# Patient Record
Sex: MALE | Race: BLACK OR AFRICAN AMERICAN | Employment: UNEMPLOYED | URBAN - METROPOLITAN AREA
[De-identification: names, ages, dates, MRNs, and addresses within clinical notes are randomized per-mention and may not be internally consistent; named-entity substitution may affect disease eponyms.]

---

## 2017-10-15 ENCOUNTER — HOSPITAL ENCOUNTER (EMERGENCY)
Age: 47
Discharge: HOME | End: 2017-10-15
Attending: EMERGENCY MEDICINE
Payer: SELF-PAY

## 2017-10-15 VITALS
SYSTOLIC BLOOD PRESSURE: 134 MMHG | RESPIRATION RATE: 16 BRPM | BODY MASS INDEX: 28.66 KG/M2 | TEMPERATURE: 97.6 F | DIASTOLIC BLOOD PRESSURE: 90 MMHG | HEART RATE: 94 BPM | OXYGEN SATURATION: 98 % | HEIGHT: 65 IN | WEIGHT: 172 LBS
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
## 2017-10-15 DIAGNOSIS — S80.11XA CONTUSION OF LEG, RIGHT, INITIAL ENCOUNTER: ICD-10-CM

## 2017-10-15 DIAGNOSIS — S39.012A STRAIN OF LUMBAR REGION, INITIAL ENCOUNTER: ICD-10-CM

## 2017-10-15 PROCEDURE — 99282 EMERGENCY DEPT VISIT SF MDM: CPT

## 2017-10-15 RX ORDER — MELOXICAM 15 MG/1
15 TABLET ORAL DAILY
Qty: 20 TAB | Refills: 0 | Status: SHIPPED | OUTPATIENT
Start: 2017-10-15 | End: 2018-01-25

## 2017-10-15 NOTE — ED NOTES
.See Nursing Assessment      Emergency Department Nursing Plan of Care       The Nursing Plan of Care is developed from the Nursing assessment and Emergency Department Attending provider initial evaluation. The plan of care may be reviewed in the ED Provider note.     The Plan of Care was developed with the following considerations:   Patient / Family readiness to learn indicated by:verbalized understanding  Persons(s) to be included in education: patient  Barriers to Learning/Limitations:No    Signed     Bry Clay RN    10/15/2017   4:45 PM

## 2017-10-15 NOTE — ED NOTES
Patient (s)  given copy of dc instructions and 0 paper script(s) and 1 electronic scripts. Patient (s)  verbalized understanding of instructions and script (s). Patient given a current medication reconciliation form and verbalized understanding of their medications. Patient (s) verbalized understanding of the importance of discussing medications with  his or her physician or clinic they will be following up with. Patient alert and oriented and in no acute distress. Patient offered wheelchair from treatment area to hospital entrance, patient declined wheelchair.      Patient was discharged by the provider

## 2017-10-15 NOTE — ED PROVIDER NOTES
Patient is a 56 y.o. male presenting with fall. Fall   Pertinent negatives include no fever, no abdominal pain, no nausea, no vomiting, no hematuria and no headaches. To ED with complaints of moderate, constant low back pain and R thigh pain. Worse with stepping up with R leg or bending. Started 2 days ago after slipping when getting out of shower, falling and landing on right side, right thigh, right hip. Has had some \"soreness\" pain since then. May have hit head, pt unsure, but no loc. No ha, no neck pain. No prior h/o back pain or joint problems. Has not tried any medication for sx. In methadone clinic, last dose today. No chest or abd pain.    Past Medical History:   Diagnosis Date    Hypertension     Sebaceous cyst 5/5/2016    Vitamin D deficiency        Past Surgical History:   Procedure Laterality Date    HX CYST REMOVAL      cyst removal from thyroid August 2016    HX HEENT      wisdom teeth         Family History:   Problem Relation Age of Onset    Hypertension Mother     Diabetes Maternal Grandmother     Hypertension Maternal Grandmother     Dementia Maternal Grandmother     Cancer Maternal Grandmother      breast     No Known Problems Brother     No Known Problems Son     No Known Problems Son     No Known Problems Son     No Known Problems Son     No Known Problems Daughter     Asthma Daughter        Social History     Social History    Marital status: LEGALLY      Spouse name: N/A    Number of children: N/A    Years of education: N/A     Occupational History          works in a [employer]     Social History Main Topics    Smoking status: Former Smoker     Packs/day: 0.50     Quit date: 3/31/2010    Smokeless tobacco: Never Used    Alcohol use 16.8 oz/week     28 Cans of beer per week    Drug use: Yes     Special: Cocaine, Heroin      Comment: last use 4 days ago     Sexual activity: Yes     Partners: Female      Comment: key-on-- ok tp give information Other Topics Concern    Not on file     Social History Narrative         ALLERGIES: Review of patient's allergies indicates no known allergies. Review of Systems   Constitutional: Negative for chills and fever. HENT: Negative for congestion, rhinorrhea and sore throat. Respiratory: Negative for cough and shortness of breath. Cardiovascular: Negative for chest pain. Gastrointestinal: Negative for abdominal pain, nausea and vomiting. Genitourinary: Negative for hematuria. Musculoskeletal: Positive for back pain. Negative for neck pain. As per HPI. No other joint sx. Skin: Negative for rash and wound. Neurological: Negative for seizures, syncope and headaches. Psychiatric/Behavioral: Negative for confusion. The patient is not nervous/anxious. All other systems reviewed and are negative. Vitals:    10/15/17 1629   BP: 134/90   Pulse: 94   Resp: 16   Temp: 97.6 °F (36.4 °C)   SpO2: 98%   Weight: 78 kg (172 lb)   Height: 5' 5\" (1.651 m)            Physical Exam   Constitutional: He is oriented to person, place, and time. He appears well-developed and well-nourished. No distress. HENT:   Head: Normocephalic. Eyes: Pupils are equal, round, and reactive to light. Right eye exhibits no discharge. Left eye exhibits no discharge. Neck: Normal range of motion. Cardiovascular: Normal rate. No murmur heard. Pulmonary/Chest: Effort normal. He has no wheezes. Abdominal: Soft. There is no tenderness. There is no rebound. Musculoskeletal:   No bony C, T, L spine ttp. Mild Tenderness to right lower paravertebral muscles and mid right buttock. Tenderness over lateral thigh muscles. No ecchymosis. No swelling. No GT tendereness. R hip FROM with slight pain to mid lateral thigh - no pain to groin or hip with hip ROM. R knee FROM with slight discomfort. Stable with stress. No effusion. No erythema. No swelling. R ankle DF/PF 5/5 B. Sensation intact.    SL negative Lymphadenopathy:     He has no cervical adenopathy. Neurological: He is alert and oriented to person, place, and time. Skin: Skin is warm and dry. He is not diaphoretic. Psychiatric: He has a normal mood and affect. His behavior is normal.   Nursing note and vitals reviewed. MDM  Number of Diagnoses or Management Options  Contusion of leg, right, initial encounter:   Fall, initial encounter:   Strain of lumbar region, initial encounter:   Diagnosis management comments: DDX: contusion, sprain, strain,   Doubt fx as he had no bony tenderness on exam and has been ambulatory for two days since fall. Leg pain could certainly be sciatica, however on exam he is more focally tender and he notes that is where he landed when fell. Discussed diagnosis with patient. Will treat with NSAIDS. Follow up PCP if not improving. ED Course       Procedures        LABORATORY TESTS:  No results found for this or any previous visit (from the past 12 hour(s)). IMAGING RESULTS:  No orders to display       MEDICATIONS GIVEN:  Medications - No data to display    IMPRESSION:  1. Fall, initial encounter    2. Contusion of leg, right, initial encounter    3. Strain of lumbar region, initial encounter        PLAN:  1. Discharge Medication List as of 10/15/2017  5:07 PM      START taking these medications    Details   meloxicam (MOBIC) 15 mg tablet Take 1 Tab by mouth daily. , Normal, Disp-20 Tab, R-0         CONTINUE these medications which have NOT CHANGED    Details   METHADONE HCL (METHADONE PO) Take  by mouth., Historical Med      metoprolol (LOPRESSOR) 25 mg tablet Take 1 Tab by mouth two (2) times a day., Normal, Disp-60 Tab, R-0      lisinopril (PRINIVIL, ZESTRIL) 20 mg tablet Take 1 Tab by mouth daily. , Normal, Disp-30 Tab, R-3           2.    Follow-up Information     None        Return to ED if worse

## 2017-10-15 NOTE — DISCHARGE INSTRUCTIONS
Back Strain: Care Instructions  Your Care Instructions    Back strain happens when you overstretch, or pull, a muscle in your back. You may hurt your back in an accident or when you exercise or lift something. Most back pain will get better with rest and time. You can take care of yourself at home to help your back heal.  Follow-up care is a key part of your treatment and safety. Be sure to make and go to all appointments, and call your doctor if you are having problems. It's also a good idea to know your test results and keep a list of the medicines you take. How can you care for yourself at home? · Try to stay as active as you can, but stop or reduce any activity that causes pain. · Put ice or a cold pack on the sore muscle for 10 to 20 minutes at a time to stop swelling. Try this every 1 to 2 hours for 3 days (when you are awake) or until the swelling goes down. Put a thin cloth between the ice pack and your skin. · After 2 or 3 days, apply a heating pad on low or a warm cloth to your back. Some doctors suggest that you go back and forth between hot and cold treatments. · Take pain medicines exactly as directed. ¨ If the doctor gave you a prescription medicine for pain, take it as prescribed. ¨ If you are not taking a prescription pain medicine, ask your doctor if you can take an over-the-counter medicine. · Try sleeping on your side with a pillow between your legs. Or put a pillow under your knees when you lie on your back. These measures can ease pain in your lower back. · Return to your usual level of activity slowly. When should you call for help? Call 911 anytime you think you may need emergency care. For example, call if:  · You are unable to move a leg at all. Call your doctor now or seek immediate medical care if:  · You have new or worse symptoms in your legs, belly, or buttocks. Symptoms may include:  ¨ Numbness or tingling. ¨ Weakness. ¨ Pain.   · You lose bladder or bowel control. Watch closely for changes in your health, and be sure to contact your doctor if you are not getting better as expected. Where can you learn more? Go to http://dena-darcie.info/. Enter V712 in the search box to learn more about \"Back Strain: Care Instructions. \"  Current as of: March 21, 2017  Content Version: 11.3  © 7385-3658 [employer]. Care instructions adapted under license by [employer] (which disclaims liability or warranty for this information). If you have questions about a medical condition or this instruction, always ask your healthcare professional. [place] [address] any warranty or liability for your use of this information. Contusion: Care Instructions  Your Care Instructions  Contusion is the medical term for a bruise. It is the result of a direct blow or an impact, such as a fall. Contusions are common sports injuries. Most people think of a bruise as a black-and-blue spot. This happens when small blood vessels get torn and leak blood under the skin. But bones, muscles, and organs can also get bruised. This may damage deep tissues but not cause a bruise you can see. The doctor will do a physical exam to find the location of your contusion. You may also have tests to make sure you do not have a more serious injury, such as a broken bone or nerve damage. These may include X-rays or other imaging tests like a CT scan or MRI. Deep-tissue contusions may cause pain and swelling. But if there is no serious damage, they will often get better in a few weeks with home treatment. The doctor has checked you carefully, but problems can develop later. If you notice any problems or new symptoms, get medical treatment right away. Follow-up care is a key part of your treatment and safety. Be sure to make and go to all appointments, and call your doctor if you are having problems.  It's also a good idea to know your test results and keep a list of the medicines you take. How can you care for yourself at home? · Put ice or a cold pack on the sore area for 10 to 20 minutes at a time to stop swelling. Put a thin cloth between the ice pack and your skin. · Be safe with medicines. Read and follow all instructions on the label. ¨ If the doctor gave you a prescription medicine for pain, take it as prescribed. ¨ If you are not taking a prescription pain medicine, ask your doctor if you can take an over-the-counter medicine. · If you can, prop up the sore area on pillows as much as possible for the next few days. Try to keep the sore area above the level of your heart. When should you call for help? Call your doctor now or seek immediate medical care if:  · Your pain gets worse. · You have new or worse swelling. · You have tingling, weakness, or numbness in the area near the contusion. · The area near the contusion is cold or pale. Watch closely for changes in your health, and be sure to contact your doctor if:  · You do not get better as expected. Where can you learn more? Go to Piki.be  Enter [id number] in the search box to learn more about \"Contusion: Care Instructions. \"   © 7750-7837 [employer]. Care instructions adapted under license by [facility] (which disclaims liability or warranty for this information). This care instruction is for use with your licensed healthcare professional. If you have questions about a medical condition or this instruction, always ask your healthcare professional. [place] [address] any warranty or liability for your use of this information.   Content Version: 56.0.912945; Current as of: May 22, 2015

## 2017-10-15 NOTE — LETTER
[facility] EMERGENCY DEPT 
[address] [place] [address] [address] [phone] Work/School Note Date: 10/15/2017 To Whom It May concern: 
 
Fouzia Calloway was seen and treated today in the emergency room by the following provider(s): 
Attending Provider: Jaz Feliciano MD 
Physician Assistant: RADHA Carter. Please excuse from work 10/16 and 10/17/2017. Fouzia Calloway may return to work on 10/18/2017. Sincerely, RADHA Carter

## 2017-12-13 ENCOUNTER — HOSPITAL ENCOUNTER (EMERGENCY)
Age: 47
Discharge: HOME | End: 2017-12-13
Attending: EMERGENCY MEDICINE
Payer: COMMERCIAL

## 2017-12-13 VITALS
RESPIRATION RATE: 16 BRPM | DIASTOLIC BLOOD PRESSURE: 87 MMHG | WEIGHT: 167 LBS | TEMPERATURE: 98.1 F | HEIGHT: 64 IN | SYSTOLIC BLOOD PRESSURE: 128 MMHG | BODY MASS INDEX: 28.51 KG/M2 | HEART RATE: 90 BPM | OXYGEN SATURATION: 100 %

## 2017-12-13 DIAGNOSIS — E86.0 DEHYDRATION: ICD-10-CM
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
## 2017-12-13 DIAGNOSIS — E87.6 HYPOKALEMIA: ICD-10-CM

## 2017-12-13 LAB
ANION GAP SERPL CALC-SCNC: 11 MMOL/L (ref 5–15)
ATRIAL RATE: 102 BPM
BASOPHILS # BLD: 0 K/UL (ref 0–0.1)
BASOPHILS NFR BLD: 0 % (ref 0–1)
BUN SERPL-MCNC: 11 MG/DL (ref 6–20)
BUN/CREAT SERPL: 10 (ref 12–20)
CALCIUM SERPL-MCNC: 8.4 MG/DL (ref 8.5–10.1)
CALCULATED P AXIS, ECG09: 59 DEGREES
CALCULATED R AXIS, ECG10: 48 DEGREES
CALCULATED T AXIS, ECG11: 40 DEGREES
CHLORIDE SERPL-SCNC: 108 MMOL/L (ref 97–108)
CO2 SERPL-SCNC: 24 MMOL/L (ref 21–32)
CREAT SERPL-MCNC: 1.1 MG/DL (ref 0.7–1.3)
DIAGNOSIS, 93000: NORMAL
EOSINOPHIL # BLD: 0 K/UL (ref 0–0.4)
EOSINOPHIL NFR BLD: 0 % (ref 0–7)
ERYTHROCYTE [DISTWIDTH] IN BLOOD BY AUTOMATED COUNT: 13.4 % (ref 11.5–14.5)
GLUCOSE BLD STRIP.AUTO-MCNC: 106 MG/DL (ref 65–100)
GLUCOSE SERPL-MCNC: 128 MG/DL (ref 65–100)
HCT VFR BLD AUTO: 42.4 % (ref 36.6–50.3)
HGB BLD-MCNC: 13.6 G/DL (ref 12.1–17)
LYMPHOCYTES # BLD: 1 K/UL (ref 0.8–3.5)
LYMPHOCYTES NFR BLD: 24 % (ref 12–49)
MCH RBC QN AUTO: 32.5 PG (ref 26–34)
MCHC RBC AUTO-ENTMCNC: 32.1 G/DL (ref 30–36.5)
MCV RBC AUTO: 101.2 FL (ref 80–99)
MONOCYTES # BLD: 0.3 K/UL (ref 0–1)
MONOCYTES NFR BLD: 7 % (ref 5–13)
NEUTS SEG # BLD: 2.9 K/UL (ref 1.8–8)
NEUTS SEG NFR BLD: 69 % (ref 32–75)
P-R INTERVAL, ECG05: 184 MS
PLATELET # BLD AUTO: 194 K/UL (ref 150–400)
POTASSIUM SERPL-SCNC: 3.3 MMOL/L (ref 3.5–5.1)
Q-T INTERVAL, ECG07: 336 MS
QRS DURATION, ECG06: 78 MS
QTC CALCULATION (BEZET), ECG08: 437 MS
RBC # BLD AUTO: 4.19 M/UL (ref 4.1–5.7)
SERVICE CMNT-IMP: ABNORMAL
SODIUM SERPL-SCNC: 143 MMOL/L (ref 136–145)
VENTRICULAR RATE, ECG03: 102 BPM
WBC # BLD AUTO: 4.3 K/UL (ref 4.1–11.1)

## 2017-12-13 PROCEDURE — 82962 GLUCOSE BLOOD TEST: CPT

## 2017-12-13 PROCEDURE — 36415 COLL VENOUS BLD VENIPUNCTURE: CPT | Performed by: EMERGENCY MEDICINE

## 2017-12-13 PROCEDURE — 80048 BASIC METABOLIC PNL TOTAL CA: CPT | Performed by: EMERGENCY MEDICINE

## 2017-12-13 PROCEDURE — 74011250636 HC RX REV CODE- 250/636: Performed by: EMERGENCY MEDICINE

## 2017-12-13 PROCEDURE — 96360 HYDRATION IV INFUSION INIT: CPT

## 2017-12-13 PROCEDURE — 85025 COMPLETE CBC W/AUTO DIFF WBC: CPT | Performed by: EMERGENCY MEDICINE

## 2017-12-13 PROCEDURE — 74011250637 HC RX REV CODE- 250/637: Performed by: EMERGENCY MEDICINE

## 2017-12-13 PROCEDURE — 99285 EMERGENCY DEPT VISIT HI MDM: CPT

## 2017-12-13 PROCEDURE — 93005 ELECTROCARDIOGRAM TRACING: CPT

## 2017-12-13 RX ORDER — POTASSIUM CHLORIDE 20MEQ/15ML
40 LIQUID (ML) ORAL
Status: DISCONTINUED | OUTPATIENT
Start: 2017-12-13 | End: 2017-12-13 | Stop reason: RX

## 2017-12-13 RX ORDER — POTASSIUM CHLORIDE 1.5 G/1.77G
40 POWDER, FOR SOLUTION ORAL
Status: COMPLETED | OUTPATIENT
Start: 2017-12-13 | End: 2017-12-13

## 2017-12-13 RX ADMIN — POTASSIUM CHLORIDE 40 MEQ: 1.5 POWDER, FOR SOLUTION ORAL at 09:35

## 2017-12-13 RX ADMIN — SODIUM CHLORIDE 1000 ML: 900 INJECTION, SOLUTION INTRAVENOUS at 09:16

## 2017-12-13 NOTE — ED NOTES
Pt given juice/bagged lunch. Pt denies any complaints. Iv fluids finishing. No si/s of acute distress. Call bell within reach.

## 2017-12-13 NOTE — ED NOTES

## 2017-12-13 NOTE — DISCHARGE INSTRUCTIONS

## 2017-12-13 NOTE — ED NOTES
....Discharge summary and discharge medications reviewed with patient and appropriate educational materials and side effects teaching were provided. No prescriptions given. Patient (s) verbalized understanding of the importance of discussing medications with his or her physician or clinic they will be following up with. No si/s of acute distress prior to discharge. Patient offered wheelchair from treatment area to hospital entrance, patient refused wheelchair. Denied pain or complaints. Pt tolerating food/water with no issues. Pt discharged with a steady gait with no si/s of acute distress.  Pt given work excuse note per request.

## 2017-12-13 NOTE — ED PROVIDER NOTES
EMERGENCY DEPARTMENT HISTORY AND PHYSICAL EXAM      Date: 12/13/2017  Patient Name: Edd Oppenheim    History of Presenting Illness     Chief Complaint   Patient presents with   [facility] Fall       History Provided By: Patient    HPI: Edd Oppenheim, 56 y.o. male with PMHx significant for HTN, presents via EMS to the ED after GLF PTA today. 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 He states he has had similar episodes in the past while working in cold conditions. He denies recent illness. Pt denies allergies to any medications. He states he discontinued taking antihypertensives ~6 months ago per his PCP. Pt specifically denies rhinorrhea, fever, chills, or decreased appetite. He notes alcohol and tobacco use. PCP: Merritt Das NP    There are no other complaints, changes, or physical findings at this time. Current Outpatient Prescriptions   Medication Sig Dispense Refill    METHADONE HCL (METHADONE PO) Take  by mouth.  meloxicam (MOBIC) 15 mg tablet Take 1 Tab by mouth daily. 20 Tab 0    metoprolol (LOPRESSOR) 25 mg tablet Take 1 Tab by mouth two (2) times a day. 60 Tab 0    lisinopril (PRINIVIL, ZESTRIL) 20 mg tablet Take 1 Tab by mouth daily.  27 Tab 3       Past History     Past Medical History:  Past Medical History:   Diagnosis Date    Hypertension     Sebaceous cyst 5/5/2016    Vitamin D deficiency        Past Surgical History:  Past Surgical History:   Procedure Laterality Date    HX CYST REMOVAL      cyst removal from thyroid August 2016    HX HEENT      wisdom teeth       Family History:  Family History   Problem Relation Age of Onset    Hypertension Mother     Diabetes Maternal Grandmother     Hypertension Maternal Grandmother     Dementia Maternal Grandmother     Cancer Maternal Grandmother      breast     No Known Problems Brother     No Known Problems Son     No Known Problems Son     No Known Problems Son     No Known Problems Son     No Known Problems Daughter     Asthma Daughter        Social History:  Social History   Substance Use Topics    Smoking status: Current Every Day Smoker     Packs/day: 0.25     Last attempt to quit: 3/31/2010    Smokeless tobacco: Never Used      Comment: 2-3cig/day    Alcohol use 16.8 oz/week     28 Cans of beer per week      Comment: 40oz \"of natty ice\" daily, last drink around 8 hrs PTA       Allergies:  No Known Allergies      Review of Systems   Review of Systems   Constitutional: Negative for activity change, appetite change (decreased), chills, fever and unexpected weight change. +GLF   HENT: Negative for congestion. Eyes: Negative for pain and visual disturbance. Respiratory: Negative for cough and shortness of breath. Cardiovascular: Negative for chest pain. Gastrointestinal: Negative for abdominal pain, diarrhea, nausea and vomiting. Genitourinary: Negative for dysuria. Musculoskeletal: Negative for back pain. Skin: Negative for rash. Neurological: Negative for syncope and headaches. Physical Exam   Physical Exam   Constitutional: He is oriented to person, place, and time. He appears well-developed and well-nourished. HENT:   Head: Normocephalic and atraumatic. Mouth/Throat: Oropharynx is clear and moist.   Eyes: Conjunctivae and EOM are normal. Pupils are equal, round, and reactive to light. Right eye exhibits no discharge. Left eye exhibits no discharge. Neck: Normal range of motion. Neck supple. Cardiovascular: Normal rate, regular rhythm and normal heart sounds. No murmur heard. Pulmonary/Chest: Effort normal and breath sounds normal. No respiratory distress. He has no wheezes. He has no rales. Abdominal: Soft. Bowel sounds are normal. He exhibits no distension. There is no tenderness. Musculoskeletal: Normal range of motion.  He exhibits no edema. No evidence of trauma to back, no vertebral tenderness or step-off   Neurological: He is alert and oriented to person, place, and time. No cranial nerve deficit. He exhibits normal muscle tone. Skin: Skin is warm and dry. No rash noted. He is not diaphoretic. Nursing note and vitals reviewed. Diagnostic Study Results     Labs -     Recent Results (from the past 12 hour(s))   GLUCOSE, POC    Collection Time: 12/13/17  8:22 AM   Result Value Ref Range    Glucose (POC) 106 (H) 65 - 100 mg/dL    Performed by Jailyn Dorado    EKG, 12 LEAD, INITIAL    Collection Time: 12/13/17  8:28 AM   Result Value Ref Range    Ventricular Rate 102 BPM    Atrial Rate 102 BPM    P-R Interval 184 ms    QRS Duration 78 ms    Q-T Interval 336 ms    QTC Calculation (Bezet) 437 ms    Calculated P Axis 59 degrees    Calculated R Axis 48 degrees    Calculated T Axis 40 degrees    Diagnosis       Sinus tachycardia  Possible Left atrial enlargement  Septal infarct , age undetermined  Abnormal ECG  No previous ECGs available         Radiologic Studies -   No orders to display     CT Results  (Last 48 hours)    None        CXR Results  (Last 48 hours)    None            Medical Decision Making   I am the first provider for this patient. I reviewed the vital signs, available nursing notes, past medical history, past surgical history, family history and social history. Vital Signs-Reviewed the patient's vital signs. Patient Vitals for the past 12 hrs:   Temp Pulse Resp BP SpO2   12/13/17 0900 - - - 129/83 -   12/13/17 0825 98.1 °F (36.7 °C) (!) 107 18 132/79 98 %       Pulse Oximetry Analysis - 98% on RA    Cardiac Monitor:   Rate: 102 bpm  Rhythm: Sinus Tachycardia     EKG interpretation: 08:30  Rhythm: sinus tachycardia; and regular . Rate (approx.): 102; Axis: normal; GA interval: normal; QRS interval: normal ; ST/T wave: normal; Other findings: possible left atrial enlargement.     Records Reviewed: Nursing Notes, Old Medical Records, Ambulance Run Sheet, Previous Radiology Studies and Previous Laboratory Studies    Records reviewed. After having similar episode (5/2016), pt was seen by Dr. Raúl Fuller and had extensive stress tests and myocardial perfusion, with normal results. Provider Notes (Medical Decision Making):     Well-appearing male without syncope, fall without injury. Extensive prior cardiac evaluation. Low suspicion for arrhythmia, head injury, metabolic dysfunction, or infection. ED Course:   Initial assessment performed. The patients presenting problems have been discussed, and they are in agreement with the care plan formulated and outlined with them. I have encouraged them to ask questions as they arise throughout their visit. 9:29 AM  Pt re-evaluated, HR 94, asymptomatic, states he is feeling fine. Discussed results, pt will finish IVF prior to discharge. Disposition:  DISCHARGE NOTE:  9:35 AM  The patient is ready for discharge. The patients signs, symptoms, diagnosis, and instructions for discharge have been discussed and the pt has conveyed their understanding. The patient is to follow up as recommended or return to the ER should their symptoms worsen. Plan has been discussed and patient has conveyed their agreement. PLAN:  1. Follow-up Information     Follow up With Details Comments Contact Info    [facility] EMERGENCY DEPT  If symptoms worsen [address]  [address] [facility] Ambar Bell NP Schedule an appointment as soon as possible for a visit for symptom recheck [address] [place] [address]  [phone]          Return to ED if worse     Diagnosis     Clinical Impression:   1. Fall, initial encounter    2. Dehydration    3. Hypokalemia        Attestations: This note is prepared by Xu Del Angel, acting as Scribe for Kristin Robison. MD Juliette.    Kristin Robison.  MD Juliette: The scribe's documentation has been prepared under my direction and personally reviewed by me in its entirety. I confirm that the note above accurately reflects all work, treatment, procedures, and medical decision making performed by me.

## 2017-12-13 NOTE — LETTER
[facility] EMERGENCY DEPT 
[address] [facility] [address] [id number] 
[phone] Work/School Note Date: 12/13/2017 To Whom It May concern: 
 
Laura Nelson was seen and treated today in the emergency room by the following provider(s): 
Attending Provider: Katiana Montgomery. MD Juliette.   
 
Laura Nelson may return to work tomorrow, on 12/14/17.  
 
Sincerely, 
 
 
 
 
Jessie Sanchez RN

## 2017-12-13 NOTE — ED TRIAGE NOTES
EMS brought pt into ED c/o GLF while working outside, fell backwards. Denies LOC/hitting head. Denies pain at present. Previous episode 1.5 yrs ago.

## 2017-12-13 NOTE — ED NOTES
Pt updated on plan of care. Pt talking on the phone. No s/si of acute distress. Call bell within reach.

## 2018-01-25 ENCOUNTER — HOSPITAL ENCOUNTER (EMERGENCY)
Age: 48
Discharge: HOME | End: 2018-01-25
Attending: EMERGENCY MEDICINE
Payer: COMMERCIAL

## 2018-01-25 VITALS
OXYGEN SATURATION: 100 % | HEART RATE: 63 BPM | SYSTOLIC BLOOD PRESSURE: 135 MMHG | DIASTOLIC BLOOD PRESSURE: 83 MMHG | TEMPERATURE: 98.3 F | RESPIRATION RATE: 18 BRPM

## 2018-01-25 DIAGNOSIS — K52.9 GASTROENTERITIS, ACUTE: Primary | ICD-10-CM

## 2018-01-25 LAB
ALBUMIN SERPL-MCNC: 3.6 G/DL (ref 3.5–5)
ALBUMIN/GLOB SERPL: 1.1 {RATIO} (ref 1.1–2.2)
ALP SERPL-CCNC: 62 U/L (ref 45–117)
ALT SERPL-CCNC: 21 U/L (ref 12–78)
ANION GAP SERPL CALC-SCNC: 9 MMOL/L (ref 5–15)
APPEARANCE UR: CLEAR
AST SERPL-CCNC: 13 U/L (ref 15–37)
BASOPHILS # BLD: 0 K/UL (ref 0–0.1)
BASOPHILS NFR BLD: 0 % (ref 0–1)
BILIRUB SERPL-MCNC: 0.4 MG/DL (ref 0.2–1)
BILIRUB UR QL: NEGATIVE
BUN SERPL-MCNC: 8 MG/DL (ref 6–20)
BUN/CREAT SERPL: 8 (ref 12–20)
CALCIUM SERPL-MCNC: 9 MG/DL (ref 8.5–10.1)
CHLORIDE SERPL-SCNC: 106 MMOL/L (ref 97–108)
CO2 SERPL-SCNC: 28 MMOL/L (ref 21–32)
COLOR UR: NORMAL
CREAT SERPL-MCNC: 1.03 MG/DL (ref 0.7–1.3)
DIFFERENTIAL METHOD BLD: ABNORMAL
EOSINOPHIL # BLD: 0 K/UL (ref 0–0.4)
EOSINOPHIL NFR BLD: 1 % (ref 0–7)
ERYTHROCYTE [DISTWIDTH] IN BLOOD BY AUTOMATED COUNT: 12.3 % (ref 11.5–14.5)
ETHANOL SERPL-MCNC: <10 MG/DL
GLOBULIN SER CALC-MCNC: 3.4 G/DL (ref 2–4)
GLUCOSE SERPL-MCNC: 95 MG/DL (ref 65–100)
GLUCOSE UR STRIP.AUTO-MCNC: NEGATIVE MG/DL
HCT VFR BLD AUTO: 45.1 % (ref 36.6–50.3)
HGB BLD-MCNC: 15.2 G/DL (ref 12.1–17)
HGB UR QL STRIP: NEGATIVE
IMM GRANULOCYTES # BLD: 0 K/UL (ref 0–0.04)
IMM GRANULOCYTES NFR BLD AUTO: 0 % (ref 0–0.5)
KETONES UR QL STRIP.AUTO: NEGATIVE MG/DL
LEUKOCYTE ESTERASE UR QL STRIP.AUTO: NEGATIVE
LIPASE SERPL-CCNC: 186 U/L (ref 73–393)
LYMPHOCYTES # BLD: 1.8 K/UL (ref 0.8–3.5)
LYMPHOCYTES NFR BLD: 32 % (ref 12–49)
MCH RBC QN AUTO: 33 PG (ref 26–34)
MCHC RBC AUTO-ENTMCNC: 33.7 G/DL (ref 30–36.5)
MCV RBC AUTO: 98 FL (ref 80–99)
MONOCYTES # BLD: 0.2 K/UL (ref 0–1)
MONOCYTES NFR BLD: 4 % (ref 5–13)
NEUTS SEG # BLD: 3.6 K/UL (ref 1.8–8)
NEUTS SEG NFR BLD: 63 % (ref 32–75)
NITRITE UR QL STRIP.AUTO: NEGATIVE
NRBC # BLD: 0 K/UL (ref 0–0.01)
NRBC BLD-RTO: 0 PER 100 WBC
PH UR STRIP: 6 [PH] (ref 5–8)
PLATELET # BLD AUTO: 237 K/UL (ref 150–400)
PMV BLD AUTO: 9.5 FL (ref 8.9–12.9)
POTASSIUM SERPL-SCNC: 3.5 MMOL/L (ref 3.5–5.1)
PROT SERPL-MCNC: 7 G/DL (ref 6.4–8.2)
PROT UR STRIP-MCNC: NEGATIVE MG/DL
RBC # BLD AUTO: 4.6 M/UL (ref 4.1–5.7)
SODIUM SERPL-SCNC: 143 MMOL/L (ref 136–145)
SP GR UR REFRACTOMETRY: 1.01 (ref 1–1.03)
UROBILINOGEN UR QL STRIP.AUTO: 1 EU/DL (ref 0.2–1)
WBC # BLD AUTO: 5.8 K/UL (ref 4.1–11.1)

## 2018-01-25 PROCEDURE — 74011000250 HC RX REV CODE- 250: Performed by: EMERGENCY MEDICINE

## 2018-01-25 PROCEDURE — 80053 COMPREHEN METABOLIC PANEL: CPT | Performed by: EMERGENCY MEDICINE

## 2018-01-25 PROCEDURE — 83690 ASSAY OF LIPASE: CPT | Performed by: EMERGENCY MEDICINE

## 2018-01-25 PROCEDURE — 80307 DRUG TEST PRSMV CHEM ANLYZR: CPT | Performed by: EMERGENCY MEDICINE

## 2018-01-25 PROCEDURE — 74011250637 HC RX REV CODE- 250/637: Performed by: EMERGENCY MEDICINE

## 2018-01-25 PROCEDURE — 96374 THER/PROPH/DIAG INJ IV PUSH: CPT

## 2018-01-25 PROCEDURE — 81003 URINALYSIS AUTO W/O SCOPE: CPT | Performed by: EMERGENCY MEDICINE

## 2018-01-25 PROCEDURE — 36415 COLL VENOUS BLD VENIPUNCTURE: CPT | Performed by: EMERGENCY MEDICINE

## 2018-01-25 PROCEDURE — 99283 EMERGENCY DEPT VISIT LOW MDM: CPT

## 2018-01-25 PROCEDURE — 74011250636 HC RX REV CODE- 250/636: Performed by: EMERGENCY MEDICINE

## 2018-01-25 PROCEDURE — 96361 HYDRATE IV INFUSION ADD-ON: CPT

## 2018-01-25 PROCEDURE — 85025 COMPLETE CBC W/AUTO DIFF WBC: CPT | Performed by: EMERGENCY MEDICINE

## 2018-01-25 RX ORDER — ONDANSETRON 4 MG/1
4 TABLET, ORALLY DISINTEGRATING ORAL
Qty: 10 TAB | Refills: 0 | Status: SHIPPED | OUTPATIENT
Start: 2018-01-25

## 2018-01-25 RX ORDER — LIDOCAINE HYDROCHLORIDE 20 MG/ML
10 SOLUTION OROPHARYNGEAL
Status: COMPLETED | OUTPATIENT
Start: 2018-01-25 | End: 2018-01-25

## 2018-01-25 RX ORDER — ONDANSETRON 2 MG/ML
4 INJECTION INTRAMUSCULAR; INTRAVENOUS
Status: COMPLETED | OUTPATIENT
Start: 2018-01-25 | End: 2018-01-25

## 2018-01-25 RX ADMIN — ONDANSETRON 4 MG: 2 INJECTION, SOLUTION INTRAMUSCULAR; INTRAVENOUS at 13:32

## 2018-01-25 RX ADMIN — LIDOCAINE HYDROCHLORIDE 10 ML: 20 SOLUTION ORAL; TOPICAL at 13:32

## 2018-01-25 RX ADMIN — SODIUM CHLORIDE 1000 ML: 900 INJECTION, SOLUTION INTRAVENOUS at 13:29

## 2018-01-25 RX ADMIN — ALUMINUM HYDROXIDE AND MAGNESIUM HYDROXIDE 30 ML: 200; 200 SUSPENSION ORAL at 13:32

## 2018-01-25 NOTE — LETTER
[address] 
[facility] EMERGENCY DEPT 
[address] [place] [address] [address] [phone] Work/School Note Date: 1/25/2018 To Whom It May concern: 
 
Conner Ramirez was seen and treated today in the emergency room by the following provider(s): 
Attending Provider: Maranda Jacobo MD.   
 
Conner Ramirez may return to work on 1/17/18. Sincerely, Maranda Jacobo MD

## 2018-01-25 NOTE — ED NOTES
Patient here with c/o abdominal pain and recent vomiting. Patient states symptoms x3-4 days. Patient states that he was recently released from an ETOH detox and states concern for \"the lining in my stomach\". Patient reports last drink was 14 days ago. Patient states that he had vomiting after breakfast this morning. Denies fevers. Reports hx of HTN but states \"my pressure is good, they took me off those medicines\". Emergency Department Nursing Plan of Care       The Nursing Plan of Care is developed from the Nursing assessment and Emergency Department Attending provider initial evaluation. The plan of care may be reviewed in the ED Provider note.     The Plan of Care was developed with the following considerations:   Patient / Family readiness to learn indicated by:verbalized understanding  Persons(s) to be included in education: patient  Barriers to Learning/Limitations:No    Signed     Sharonda Cheema RN    1/25/2018   12:34 PM

## 2018-01-25 NOTE — ED TRIAGE NOTES
Patient here c/o generalized abd pain with vomiting. States eating it makes me \" feel like I have acid\". Patient states he is in ETOH program, none x 14 days.

## 2018-01-25 NOTE — ED PROVIDER NOTES
EMERGENCY DEPARTMENT HISTORY AND PHYSICAL EXAM      Date: 1/25/2018  Patient Name: Jose Raul Kessler    History of Presenting Illness     Chief Complaint   Patient presents with    Abdominal Pain     recent alcohol detox, states vomiting this morning       History Provided By: Patient    HPI: Jose Raul Kessler, 56 y.o. male with PMHx significant for HTN and alcohol abuse, presents ambulatory to the ED with cc of constant, diffuse abdominal pain which started 3 days ago. He also c/o associated vomiting and nausea. Pt describes that it \"feels like he is burping up acid\". His associated pain is mild and described as a dull pain. He has recently been released from an alcohol detox. Pt notes he has not had any alcohol for 14 days. He denies a history of abdominal surgeries. Pt denies diarrhea or hemoptysis. He is otherwise without complaint. PCP: Lexus Plummer NP    There are no other complaints, changes, or physical findings at this time. Current Facility-Administered Medications   Medication Dose Route Frequency Provider Last Rate Last Dose    sodium chloride 0.9 % bolus infusion 1,000 mL  1,000 mL IntraVENous ONCE Precious Chapa MD 1,000 mL/hr at 01/25/18 1329 1,000 mL at 01/25/18 1329     Current Outpatient Prescriptions   Medication Sig Dispense Refill    ondansetron (ZOFRAN ODT) 4 mg disintegrating tablet Take 1 Tab by mouth every eight (8) hours as needed for Nausea.  10 Tab 0       Past History     Past Medical History:  Past Medical History:   Diagnosis Date    Hypertension     Sebaceous cyst 5/5/2016    Vitamin D deficiency        Past Surgical History:  Past Surgical History:   Procedure Laterality Date    HX CYST REMOVAL      cyst removal from thyroid August 2016    HX HEENT      wisdom teeth       Family History:  Family History   Problem Relation Age of Onset    Hypertension Mother     Diabetes Maternal Grandmother     Hypertension Maternal Grandmother     Dementia Maternal Grandmother     Cancer Maternal Grandmother      breast     No Known Problems Brother     No Known Problems Son     No Known Problems Son     No Known Problems Son     No Known Problems Son     No Known Problems Daughter     Asthma Daughter        Social History:  Social History   Substance Use Topics    Smoking status: Current Every Day Smoker     Packs/day: 0.25     Last attempt to quit: 3/31/2010    Smokeless tobacco: Never Used      Comment: 2-3cig/day    Alcohol use No      Comment: 40oz \"of natty ice\" daily, last drink around 8 hrs PTA       Allergies:  No Known Allergies      Review of Systems   Review of Systems   Constitutional: Negative for chills and fever. HENT: Negative for congestion, rhinorrhea, sneezing and sore throat. Eyes: Negative for redness and visual disturbance. Respiratory: Negative for shortness of breath. Cardiovascular: Negative for chest pain and leg swelling. Gastrointestinal: Positive for abdominal pain (diffuse), nausea and vomiting. Negative for diarrhea. - hemoptysis    Genitourinary: Negative for difficulty urinating and frequency. Musculoskeletal: Negative for back pain, myalgias and neck stiffness. Skin: Negative for rash. Neurological: Negative for dizziness, syncope, weakness and headaches. Hematological: Negative for adenopathy. All other systems reviewed and are negative. Physical Exam   Physical Exam   Constitutional: He is oriented to person, place, and time. He appears well-developed and well-nourished. HENT:   Head: Normocephalic and atraumatic. Nose: Nose normal.   Mouth/Throat: Oropharynx is clear and moist.   Eyes: Conjunctivae and EOM are normal. Pupils are equal, round, and reactive to light. Neck: Normal range of motion. Neck supple. Cardiovascular: Normal rate, regular rhythm, normal heart sounds and intact distal pulses. Pulmonary/Chest: Effort normal and breath sounds normal.   Abdominal: Soft.  Bowel sounds are normal. He exhibits no distension. Musculoskeletal: Normal range of motion. He exhibits no edema. Neurological: He is alert and oriented to person, place, and time. He exhibits normal muscle tone. Skin: Skin is warm and dry. No erythema. Psychiatric: He has a normal mood and affect. His behavior is normal. Judgment and thought content normal.     Diagnostic Study Results     Labs -     Recent Results (from the past 12 hour(s))   CBC WITH AUTOMATED DIFF    Collection Time: 01/25/18  1:05 PM   Result Value Ref Range    WBC 5.8 4.1 - 11.1 K/uL    RBC 4.60 4.10 - 5.70 M/uL    HGB 15.2 12.1 - 17.0 g/dL    HCT 45.1 36.6 - 50.3 %    MCV 98.0 80.0 - 99.0 FL    MCH 33.0 26.0 - 34.0 PG    MCHC 33.7 30.0 - 36.5 g/dL    RDW 12.3 11.5 - 14.5 %    PLATELET 111 770 - 232 K/uL    MPV 9.5 8.9 - 12.9 FL    NRBC 0.0 0  WBC    ABSOLUTE NRBC 0.00 0.00 - 0.01 K/uL    NEUTROPHILS 63 32 - 75 %    LYMPHOCYTES 32 12 - 49 %    MONOCYTES 4 (L) 5 - 13 %    EOSINOPHILS 1 0 - 7 %    BASOPHILS 0 0 - 1 %    IMMATURE GRANULOCYTES 0 0.0 - 0.5 %    ABS. NEUTROPHILS 3.6 1.8 - 8.0 K/UL    ABS. LYMPHOCYTES 1.8 0.8 - 3.5 K/UL    ABS. MONOCYTES 0.2 0.0 - 1.0 K/UL    ABS. EOSINOPHILS 0.0 0.0 - 0.4 K/UL    ABS. BASOPHILS 0.0 0.0 - 0.1 K/UL    ABS. IMM. GRANS. 0.0 0.00 - 0.04 K/UL    DF AUTOMATED     METABOLIC PANEL, COMPREHENSIVE    Collection Time: 01/25/18  1:05 PM   Result Value Ref Range    Sodium 143 136 - 145 mmol/L    Potassium 3.5 3.5 - 5.1 mmol/L    Chloride 106 97 - 108 mmol/L    CO2 28 21 - 32 mmol/L    Anion gap 9 5 - 15 mmol/L    Glucose 95 65 - 100 mg/dL    BUN 8 6 - 20 MG/DL    Creatinine 1.03 0.70 - 1.30 MG/DL    BUN/Creatinine ratio 8 (L) 12 - 20      GFR est AA >60 >60 ml/min/1.73m2    GFR est non-AA >60 >60 ml/min/1.73m2    Calcium 9.0 8.5 - 10.1 MG/DL    Bilirubin, total 0.4 0.2 - 1.0 MG/DL    ALT (SGPT) 21 12 - 78 U/L    AST (SGOT) 13 (L) 15 - 37 U/L    Alk.  phosphatase 62 45 - 117 U/L    Protein, total 7.0 6.4 - 8.2 g/dL    Albumin 3.6 3.5 - 5.0 g/dL    Globulin 3.4 2.0 - 4.0 g/dL    A-G Ratio 1.1 1.1 - 2.2     LIPASE    Collection Time: 01/25/18  1:05 PM   Result Value Ref Range    Lipase 186 73 - 393 U/L   URINALYSIS W/ RFLX MICROSCOPIC    Collection Time: 01/25/18  1:05 PM   Result Value Ref Range    Color YELLOW/STRAW      Appearance CLEAR CLEAR      Specific gravity 1.015 1.003 - 1.030      pH (UA) 6.0 5.0 - 8.0      Protein NEGATIVE  NEG mg/dL    Glucose NEGATIVE  NEG mg/dL    Ketone NEGATIVE  NEG mg/dL    Bilirubin NEGATIVE  NEG      Blood NEGATIVE  NEG      Urobilinogen 1.0 0.2 - 1.0 EU/dL    Nitrites NEGATIVE  NEG      Leukocyte Esterase NEGATIVE  NEG     ETHYL ALCOHOL    Collection Time: 01/25/18  1:05 PM   Result Value Ref Range    ALCOHOL(ETHYL),SERUM <10 <10 MG/DL     Medical Decision Making   I am the first provider for this patient. I reviewed the vital signs, available nursing notes, past medical history, past surgical history, family history and social history. Vital Signs-Reviewed the patient's vital signs. Patient Vitals for the past 12 hrs:   Temp Pulse Resp BP SpO2   01/25/18 1221 98.3 °F (36.8 °C) 63 18 135/83 100 %       Pulse Oximetry Analysis - 100% on RA    Cardiac Monitor:   Rate: 63 bpm  Rhythm: Normal Sinus Rhythm      Records Reviewed: Nursing Notes and Old Medical Records    Provider Notes (Medical Decision Making):   DDx: alcoholic gastritis, gastroenteritis     ED Course:   Initial assessment performed. The patients presenting problems have been discussed, and they are in agreement with the care plan formulated and outlined with them. I have encouraged them to ask questions as they arise throughout their visit. 2:14 PM  Patient states he is feeling better. He is ready for discharge. Disposition:  DISCHARGE NOTE  2:14 PM  The patient has been re-evaluated and is ready for discharge. Reviewed available results with patient. Counseled patient on diagnosis and care plan.  Patient has expressed understanding, and all questions have been answered. Patient agrees with plan and agrees to follow up as recommended, or return to the ED if their symptoms worsen. Discharge instructions have been provided and explained to the patient, along with reasons to return to the ED. PLAN:  1. Current Discharge Medication List      START taking these medications    Details   ondansetron (ZOFRAN ODT) 4 mg disintegrating tablet Take 1 Tab by mouth every eight (8) hours as needed for Nausea. Qty: 10 Tab, Refills: 0           2. Follow-up Information     Follow up With Details Comments [address], NP Call  [facility] [address]  [phone]      [facility] EMERGENCY DEPT  As needed, [facility] symptoms worsen [facility]  [phone]        Return to ED if worse     Diagnosis     Clinical Impression:   1. Gastroenteritis, acute        Attestations:    Attestation Note:  This note is prepared by MIYA Louise, acting as Scribe for MD Paula Slater MD: The scribe's documentation has been prepared under my direction and personally reviewed by me in its entirety. I confirm that the note above accurately reflects all work, treatment, procedures, and medical decision making performed by me.

## 2018-01-25 NOTE — DISCHARGE INSTRUCTIONS
Gastroenteritis: Care Instructions  Your Care Instructions    Gastroenteritis is an illness that may cause nausea, vomiting, and diarrhea. It is sometimes called \"stomach flu. \" It can be caused by bacteria or a virus. You will probably begin to feel better in 1 to 2 days. In the meantime, get plenty of rest and make sure you do not become dehydrated. Dehydration occurs when your body loses too much fluid. Follow-up care is a key part of your treatment and safety. Be sure to make and go to all appointments, and call your doctor if you are having problems. It's also a good idea to know your test results and keep a list of the medicines you take. How can you care for yourself at home? · If your doctor prescribed antibiotics, take them as directed. Do not stop taking them just because you feel better. You need to take the full course of antibiotics. · Drink plenty of fluids to prevent dehydration, enough so that your urine is light yellow or clear like water. Choose water and other caffeine-free clear liquids until you feel better. If you have kidney, heart, or liver disease and have to limit fluids, talk with your doctor before you increase your fluid intake. · Drink fluids slowly, in frequent, small amounts, because drinking too much too fast can cause vomiting. · Begin eating mild foods, such as dry toast, yogurt, applesauce, bananas, and rice. Avoid spicy, hot, or high-fat foods, and do not drink alcohol or caffeine for a day or two. Do not drink milk or eat ice cream until you are feeling better. How to prevent gastroenteritis  · Keep hot foods hot and cold foods cold. · Do not eat meats, dressings, salads, or other foods that have been kept at room temperature for more than 2 hours. · Use a thermometer to check your refrigerator. It should be between 34°F and 40°F.  · Defrost meats in the refrigerator or microwave, not on the kitchen counter. · Keep your hands and your kitchen clean.  Wash your hands, cutting boards, and countertops with hot soapy water frequently. · Cook meat until it is well done. · Do not eat raw eggs or uncooked sauces made with raw eggs. · Do not take chances. If food looks or tastes spoiled, throw it out. When should you call for help? Call 911 anytime you think you may need emergency care. For example, call if:  ? · You vomit blood or what looks like coffee grounds. ? · You passed out (lost consciousness). ? · You pass maroon or very bloody stools. ?Call your doctor now or seek immediate medical care if:  ? · You have severe belly pain. ? · You have signs of needing more fluids. You have sunken eyes, a dry mouth, and pass only a little dark urine. ? · You feel like you are going to faint. ? · You have increased belly pain that does not go away in 1 to 2 days. ? · You have new or increased nausea, or you are vomiting. ? · You have a new or higher fever. ? · Your stools are black and tarlike or have streaks of blood. ? Watch closely for changes in your health, and be sure to contact your doctor if:  ? · You are dizzy or lightheaded. ? · You urinate less than usual, or your urine is dark yellow or brown. ? · You do not feel better with each day that goes by. Where can you learn more? Go to http://dena-darcie.info/. Enter N142 in the search box to learn more about \"Gastroenteritis: Care Instructions. \"  Current as of: March 3, 2017  Content Version: 11.4  © 0045-3512 [employer]. Care instructions adapted under license by [employer] (which disclaims liability or warranty for this information). If you have questions about a medical condition or this instruction, always ask your healthcare professional. [address] any warranty or liability for your use of this information.

## 2018-01-25 NOTE — LETTER
[facility] EMERGENCY DEPT 
[address] [facility] [address] [address] 
[phone] Work/School Note Date: 1/25/2018 To Whom It May concern: 
 
Nate Oliva was seen and treated today in the emergency room by the following provider(s): 
Attending Provider: Frieda Yuan MD.   
 
Nate Oliva may return to work on 1/27/18. Sincerely, Frieda Yuan MD

## 2018-01-25 NOTE — ED NOTES
Pt  given copy of dc instructions and  scripts(s). Patient verbalized understanding of instructions and script (s). Patient given a current medication reconciliation form verbalized and understanding of their medications. Patient  verbalized understanding of the importance of discussing medications with his or her physician or clinic they will be following up with. Patient alert and oriented and in in no acute distress. Patient discharged home ambulatory.